# Patient Record
Sex: MALE | Race: WHITE | NOT HISPANIC OR LATINO | URBAN - METROPOLITAN AREA
[De-identification: names, ages, dates, MRNs, and addresses within clinical notes are randomized per-mention and may not be internally consistent; named-entity substitution may affect disease eponyms.]

---

## 2020-01-03 ENCOUNTER — OUTPATIENT (OUTPATIENT)
Dept: OUTPATIENT SERVICES | Facility: HOSPITAL | Age: 55
LOS: 1 days | Discharge: HOME | End: 2020-01-03
Payer: COMMERCIAL

## 2020-01-03 DIAGNOSIS — R91.8 OTHER NONSPECIFIC ABNORMAL FINDING OF LUNG FIELD: ICD-10-CM

## 2020-01-03 PROCEDURE — 71250 CT THORAX DX C-: CPT | Mod: 26

## 2022-01-12 PROBLEM — Z00.00 ENCOUNTER FOR PREVENTIVE HEALTH EXAMINATION: Status: ACTIVE | Noted: 2022-01-12

## 2022-01-13 ENCOUNTER — APPOINTMENT (OUTPATIENT)
Age: 57
End: 2022-01-13
Payer: COMMERCIAL

## 2022-01-13 VITALS
WEIGHT: 167 LBS | RESPIRATION RATE: 12 BRPM | OXYGEN SATURATION: 98 % | DIASTOLIC BLOOD PRESSURE: 70 MMHG | SYSTOLIC BLOOD PRESSURE: 120 MMHG | HEART RATE: 75 BPM

## 2022-01-13 PROCEDURE — 71046 X-RAY EXAM CHEST 2 VIEWS: CPT

## 2022-01-13 PROCEDURE — 99213 OFFICE O/P EST LOW 20 MIN: CPT | Mod: 25

## 2022-01-13 NOTE — PROCEDURE
[FreeTextEntry1] : CXR PA and Lateral \par The costophrenic and cardiophrenic angles are sharp\par The mike parenchyma shows no infiltrates, consolidations, or nodules \par The Mediastinum is within normal limits\par No pleural effusions\par

## 2023-03-23 ENCOUNTER — APPOINTMENT (OUTPATIENT)
Dept: PULMONOLOGY | Facility: CLINIC | Age: 58
End: 2023-03-23
Payer: COMMERCIAL

## 2023-03-23 VITALS
DIASTOLIC BLOOD PRESSURE: 70 MMHG | BODY MASS INDEX: 26.2 KG/M2 | HEIGHT: 66 IN | WEIGHT: 163 LBS | SYSTOLIC BLOOD PRESSURE: 102 MMHG | RESPIRATION RATE: 14 BRPM | OXYGEN SATURATION: 98 % | HEART RATE: 75 BPM

## 2023-03-23 PROCEDURE — 99212 OFFICE O/P EST SF 10 MIN: CPT

## 2023-11-09 ENCOUNTER — NON-APPOINTMENT (OUTPATIENT)
Age: 58
End: 2023-11-09

## 2024-03-19 ENCOUNTER — APPOINTMENT (OUTPATIENT)
Dept: PULMONOLOGY | Facility: CLINIC | Age: 59
End: 2024-03-19
Payer: COMMERCIAL

## 2024-03-19 VITALS
OXYGEN SATURATION: 98 % | BODY MASS INDEX: 26.03 KG/M2 | RESPIRATION RATE: 14 BRPM | WEIGHT: 162 LBS | SYSTOLIC BLOOD PRESSURE: 108 MMHG | HEART RATE: 88 BPM | HEIGHT: 66 IN | DIASTOLIC BLOOD PRESSURE: 88 MMHG

## 2024-03-19 DIAGNOSIS — G47.33 OBSTRUCTIVE SLEEP APNEA (ADULT) (PEDIATRIC): ICD-10-CM

## 2024-03-19 PROCEDURE — 94729 DIFFUSING CAPACITY: CPT

## 2024-03-19 PROCEDURE — 99213 OFFICE O/P EST LOW 20 MIN: CPT | Mod: 25

## 2024-03-19 PROCEDURE — 94727 GAS DIL/WSHOT DETER LNG VOL: CPT

## 2024-03-19 PROCEDURE — 94010 BREATHING CAPACITY TEST: CPT

## 2024-04-03 ENCOUNTER — APPOINTMENT (OUTPATIENT)
Dept: ORTHOPEDIC SURGERY | Facility: CLINIC | Age: 59
End: 2024-04-03
Payer: COMMERCIAL

## 2024-04-03 PROCEDURE — 20605 DRAIN/INJ JOINT/BURSA W/O US: CPT | Mod: LT

## 2024-04-03 PROCEDURE — 99203 OFFICE O/P NEW LOW 30 MIN: CPT | Mod: 25

## 2024-04-03 PROCEDURE — 73080 X-RAY EXAM OF ELBOW: CPT | Mod: LT

## 2024-04-07 VITALS — HEIGHT: 66 IN

## 2024-04-07 NOTE — HISTORY OF PRESENT ILLNESS
[de-identified] : Pt is a 57 yo male here for his left elbow. He noticed swelling over the elbow. Denies any new or recent trauma that he can recall.

## 2024-04-07 NOTE — DISCUSSION/SUMMARY
[de-identified] : Today under sterile technique and with the patient's consent, I aspirated 12 cc of serousanginous fluid from the olecranon bursa. The patient tolerated the procedure well. The puncture site was covered with a band aid. I wrapped his elbow in an ace wrap. I stressed the importance of wearing an elbow compression sleeve 24/7 for the next several weeks. He will avoid leaning on his elbow. I will see him back in about 2 weeks for repeat evaluation since he is going on vacation. We may consider aspirating again. All questions were answered today.

## 2024-04-07 NOTE — PHYSICAL EXAM
[de-identified] : PE of his left elbow: swelling of the olecranon bursa. Negative erythema or warmth. no signs of infection. FROM of the elbow, wrist, and hand. Nontender over the olecranon bursa.

## 2024-04-07 NOTE — DATA REVIEWED
[FreeTextEntry1] : 3 x-ray views taken in the office today of his left elbow show no acute displaced fractures or bony abnormalities.

## 2024-04-17 ENCOUNTER — APPOINTMENT (OUTPATIENT)
Dept: ORTHOPEDIC SURGERY | Facility: CLINIC | Age: 59
End: 2024-04-17
Payer: COMMERCIAL

## 2024-04-17 VITALS — BODY MASS INDEX: 26.03 KG/M2 | HEIGHT: 66 IN | WEIGHT: 162 LBS

## 2024-04-17 DIAGNOSIS — M70.22 OLECRANON BURSITIS, LEFT ELBOW: ICD-10-CM

## 2024-04-17 PROCEDURE — 99213 OFFICE O/P EST LOW 20 MIN: CPT

## 2024-04-17 NOTE — PHYSICAL EXAM
[de-identified] : PE of his left elbow: mild swelling of the olecranon bursa. Negative erythema or warmth. no signs of infection. FROM of the elbow, wrist, and hand. Nontender over the olecranon bursa.

## 2024-04-17 NOTE — HISTORY OF PRESENT ILLNESS
[de-identified] : Pt is a 59 yo male here for his left elbow.  He has a overall, he feels it is better but it did fill up a little bit more with fluid.  He has been wearing the compression sleeve history of bursitis that was drained a couple weeks ago.  24 hours a day and 7 days a week.

## 2024-04-17 NOTE — DISCUSSION/SUMMARY
[de-identified] : There is still some mild swelling over the olecranon bursa.  It looks smaller than it was at the last visit.  We discussed the option of draining it again.  He would like to hold off on this and continue with the compression sleeve and lifestyle modification including avoiding leaning on it.  He will contact the office if he would like to have it drained in the future.  All questions were answered today.

## 2025-03-20 ENCOUNTER — APPOINTMENT (OUTPATIENT)
Dept: PULMONOLOGY | Facility: CLINIC | Age: 60
End: 2025-03-20
Payer: COMMERCIAL

## 2025-03-20 VITALS
BODY MASS INDEX: 26.31 KG/M2 | HEART RATE: 77 BPM | OXYGEN SATURATION: 98 % | WEIGHT: 163 LBS | SYSTOLIC BLOOD PRESSURE: 124 MMHG | DIASTOLIC BLOOD PRESSURE: 82 MMHG

## 2025-03-20 DIAGNOSIS — G47.33 OBSTRUCTIVE SLEEP APNEA (ADULT) (PEDIATRIC): ICD-10-CM

## 2025-03-20 PROCEDURE — 99214 OFFICE O/P EST MOD 30 MIN: CPT

## 2025-05-03 ENCOUNTER — OUTPATIENT (OUTPATIENT)
Dept: OUTPATIENT SERVICES | Facility: HOSPITAL | Age: 60
LOS: 1 days | End: 2025-05-03
Payer: COMMERCIAL

## 2025-05-03 ENCOUNTER — RESULT REVIEW (OUTPATIENT)
Age: 60
End: 2025-05-03

## 2025-05-03 DIAGNOSIS — R91.1 SOLITARY PULMONARY NODULE: ICD-10-CM

## 2025-05-03 DIAGNOSIS — Z00.8 ENCOUNTER FOR OTHER GENERAL EXAMINATION: ICD-10-CM

## 2025-05-03 PROCEDURE — 71250 CT THORAX DX C-: CPT

## 2025-05-03 PROCEDURE — 71250 CT THORAX DX C-: CPT | Mod: 26

## 2025-05-04 DIAGNOSIS — R91.1 SOLITARY PULMONARY NODULE: ICD-10-CM
